# Patient Record
Sex: FEMALE | Race: WHITE | NOT HISPANIC OR LATINO | Employment: UNEMPLOYED | URBAN - METROPOLITAN AREA
[De-identification: names, ages, dates, MRNs, and addresses within clinical notes are randomized per-mention and may not be internally consistent; named-entity substitution may affect disease eponyms.]

---

## 2018-01-15 LAB
C TRACH RRNA SPEC QL PROBE: NEGATIVE
N GONORRHOEA RRNA SPEC QL PROBE: NEGATIVE

## 2018-02-14 ENCOUNTER — OFFICE VISIT (OUTPATIENT)
Dept: PERINATAL CARE | Facility: CLINIC | Age: 38
End: 2018-02-14
Payer: COMMERCIAL

## 2018-02-14 VITALS
DIASTOLIC BLOOD PRESSURE: 53 MMHG | HEIGHT: 63 IN | HEART RATE: 66 BPM | BODY MASS INDEX: 21.58 KG/M2 | WEIGHT: 121.8 LBS | SYSTOLIC BLOOD PRESSURE: 109 MMHG

## 2018-02-14 DIAGNOSIS — Z31.5 ENCOUNTER FOR PROCREATIVE GENETIC COUNSELING: Primary | ICD-10-CM

## 2018-02-14 DIAGNOSIS — O09.521 MATERNAL AGE 35+, MULTIGRAVIDA, FIRST TRIMESTER: ICD-10-CM

## 2018-02-14 PROCEDURE — 99205 OFFICE O/P NEW HI 60 MIN: CPT | Performed by: GENETIC COUNSELOR, MS

## 2018-02-14 RX ORDER — CYANOCOBALAMIN (VITAMIN B-12) 500 MCG
TABLET ORAL
COMMUNITY

## 2018-02-14 RX ORDER — PNV NO.95/FERROUS FUM/FOLIC AC 28MG-0.8MG
1 TABLET ORAL DAILY
COMMUNITY

## 2018-02-14 NOTE — PROGRESS NOTES
Genetic Counseling Note        Cherelle Fall     Appointment Date:  2/14/2018  Referred By: Porfirio Ching DO  YOB: 1980  Partner:  Eldora Boeck    Pregnancy History: [id number]  Estimated Date of Delivery: 9/2/18  Estimated Gestational Age: 5 weeks 3 days     Genetic Counseling:advanced maternal age    Khoa Bowens is a(n) 44 y o  female who is here to discuss maternal age related risk for aneuploidy    Issues Discussed:  Average population risk: 3-4% in the average pregnancy of serious condition or birth defect  2-3% risk of mental retardation  Not all detected by prenatal testing  Chromosomal: non-disjunction 1/104 risk overall, 1/189 risk for Down syndrome  Maternal age  Risk of aneuploidy    Options Discussed:  Amniocentesis: risks and limitations discussed  CVS: risks and limitations discussed  Level II ultrasound to screen for structural anomalies  Nuchal translucency/1st trimester serum screen: goals and limitations discussed  Cell free fetal DNA testing    Additional Information / Impression / Plan / Tests Ordered:  After discussing the available prenatal diagnostic and screening procedures Erica elected to decline prenatal diagnostic testing at this time  She did opt to move forward with cell free DNA  Blood will be drawn following the genetic counseling session and sent to [facility]  In addition she is planning to pursue nuchal translucency ultrasound, MSAFP and level 2 ultrasound evaluations at the appropriate times  During our counseling session histories were taken on the patient's family and her 's family  Khoa Bowens reports that her 's family history is negative for any prior known cases of intellectual disability, birth defects or single gene disorders  In reviewing her own family history she reports having a male paternal 1st cousin, the son of her father sister, who has Down syndrome  This is the only known case of Down syndrome in her family    We briefly discussed the fact the majority of cases of Down syndrome (95%) results from a random air in chromosome division, often related to maternal age, and are not hereditary  Less than 5% of cases of Down syndrome are due to a chromosome rearrangement that can be inherited in the family  Given the distance of the relative this history is not likely to significantly increase the patient's risk, over her age related risk, over having a child with Down syndrome  Cell free DNA testing will detect Down syndrome caused by either non destruction or a chromosome translocation  In further reviewing her family history Erica reports having a maternal uncle with intellectual disability  She was not aware of a specific diagnosis for this uncles intellectual disability therefore does not possible to clearly define any risk to her current pregnancy  She was made aware of fragile X syndrome which is the most common inherited form of intellectual disability  She believes that she had this and other carrier screening performed through her reproductive endocrinologist the results of which were negative though I do not currently have records documenting information  A record release form was faxed to her reproductive endocrinologist office to obtain that information  The patient reports that she and her  are both of [place] and [facility] descent with no known Shinto ancestry  Becki Brewer reports that she believes she had CF and SMA carrier screening performed as well  In addition given the history of repeated pregnancy loss she reports that she and her  also had chromosome analysis performed the results of which revealed they both have normal karyotypes  Those records were requested as well  Becki Brewer indicated that it was ultimately determined she has a uterine septum and slightly bicornuate uterus which is believed to have attributed to all the pregnancy losses      Lastly, we discussed the fact that it is important to keep in mind that everyone in the general population regardless of age, family history, or medical background has approximately a 3% risk of having a child with some type of her defect, genetic disease or intellectual disability  Currently there are no tests available to rule out all birth defects or health problems            Time spent with Genetic Counselor: 60 minutes

## 2018-03-02 ENCOUNTER — TELEPHONE (OUTPATIENT)
Dept: PERINATAL CARE | Facility: CLINIC | Age: 38
End: 2018-03-02

## 2018-03-02 NOTE — TELEPHONE ENCOUNTER
Pt notified and aware of NIPT results-wnl  Pt also aware of gender  MSAFP  Instruction given to pt  TRF mailed  Results faxed to OB

## 2018-04-12 ENCOUNTER — TELEPHONE (OUTPATIENT)
Dept: PERINATAL CARE | Facility: CLINIC | Age: 38
End: 2018-04-12

## 2018-06-20 ENCOUNTER — TRANSCRIBE ORDERS (OUTPATIENT)
Dept: PERINATAL CARE | Facility: CLINIC | Age: 38
End: 2018-06-20

## 2018-06-20 DIAGNOSIS — O99.810 ABNORMAL GLUCOSE COMPLICATING PREGNANCY: Primary | ICD-10-CM

## 2018-06-22 ENCOUNTER — OFFICE VISIT (OUTPATIENT)
Dept: PERINATAL CARE | Facility: CLINIC | Age: 38
End: 2018-06-22
Payer: COMMERCIAL

## 2018-06-22 VITALS
HEART RATE: 98 BPM | HEIGHT: 63 IN | WEIGHT: 139.8 LBS | BODY MASS INDEX: 24.77 KG/M2 | DIASTOLIC BLOOD PRESSURE: 71 MMHG | SYSTOLIC BLOOD PRESSURE: 121 MMHG

## 2018-06-22 DIAGNOSIS — O24.410 DIET CONTROLLED GESTATIONAL DIABETES MELLITUS (GDM) IN THIRD TRIMESTER: Primary | ICD-10-CM

## 2018-06-22 DIAGNOSIS — O99.810 ABNORMAL GLUCOSE COMPLICATING PREGNANCY: ICD-10-CM

## 2018-06-22 PROCEDURE — G0108 DIAB MANAGE TRN  PER INDIV: HCPCS | Performed by: DIETITIAN, REGISTERED

## 2018-06-22 RX ORDER — BLOOD-GLUCOSE METER
KIT MISCELLANEOUS
Qty: 1 EACH | Refills: 0 | Status: SHIPPED | OUTPATIENT
Start: 2018-06-22 | End: 2018-09-01

## 2018-06-22 RX ORDER — LANCETS 28 GAUGE
EACH MISCELLANEOUS
Qty: 100 EACH | Refills: 3 | Status: SHIPPED | OUTPATIENT
Start: 2018-06-22 | End: 2018-09-01

## 2018-06-22 RX ORDER — BLOOD-GLUCOSE METER
KIT MISCELLANEOUS
Qty: 100 EACH | Refills: 4 | Status: SHIPPED | OUTPATIENT
Start: 2018-06-22 | End: 2018-07-11 | Stop reason: SDUPTHER

## 2018-06-22 NOTE — PROGRESS NOTES
Date:  18  RE: Kirk Libman    : 1980  KATTY: Estimated Date of Delivery: 2018   EGA:              Dear Dr Cummings Ramp: Thank you for referring your patient to the [facility]regnancy Program at [address]  The patient attended Class 1 received the following education:     Pathophysiology of diabetes and pregnancy  This includes maternal-fetal complications such as fetal macrosomia,  hypoglycemia, polyhydramnios, increased incidence of  section, pre-term labor and in severe cases, fetal demise and stillbirth   Self-monitoring of blood glucose levels: fasting (goal 60mg/dl to 90mg/dl) and two hours after the start of the meal less (goal less than 120mg/dl)  The patient was shown how to use a Freestyle Lite blood glucose meter and prescriptions were sent to her pharmacy   Medical Nutrition Therapy for diabetes and pregnancy  The patient was provided with a 1900 calorie gestational diabetes meal plan and the following was reviewed:     o Basic review of macronutrients   o Meal pattern should consist of three small meals and three snacks daily  o Carbohydrate gram amounts per meal   o Instructions on how to read a food label  o Appropriate serving sizes for carbohydrates and proteins  o Incorporating protein at each meal and snack  o Maintain a three day food diary and bring to class 2    Report blood glucose levels to the [facility] weekly or as directed:  o Phone : [phone]  If no response in 24 hours, call [phone]   o Fax: [phone]  o Email: [email or web address]  The patient is scheduled to attend class 2 on Tuesday, 7/3/18  Additionally, fetal ultrasound evaluation by the Perinatologist has been scheduled to assure continuity of care  Please contact the Diabetes and Pregnancy Program at [phone] if you have any questions    Time spent with patient 11:30 AM-12:30 PM; time spent face to face counseling greater than 50% of the appointment      Sincerely,   Arias Jenkins  Diabetes Educator   Di[facility]regnancy [facility]

## 2018-06-28 ENCOUNTER — TELEPHONE (OUTPATIENT)
Dept: PERINATAL CARE | Facility: CLINIC | Age: 38
End: 2018-06-28

## 2018-06-28 NOTE — TELEPHONE ENCOUNTER
Date:  18  RE: Felipa Dietrich    : 1980  Estimated Date of Delivery: 18  EGA: 30w5d   OB/GYN: [facility]    Diet controlled gestational diabetes using a Free Style lite glucose meter  Date Fasting Post-  breakfast Post-  lunch Post-  dinner Before bedtime Carbs Comments    81 106 82 94       83 93 115 91       85 73 92 108       80 94 80 110       83 62 95 108       79 44                    Patient called today reporting hypoglycemia this morning following increased activity on  after dinner and also for  after breakfast   Patient treated hypoglycemia after breakfast on  with approximately 30 gms carbohydrate after eating a large banana  Patient stated she has a fairly active lifestyle and that current 1900 calories did not seem to be enough calories  Patient reported that glucose meter and supplies were stored correctly  Current regimen:  1900 calorie gestational diabetes meal plan; 3 meals and 3 snacks  Self-blood glucose monitoring 4 times per day; fasting and 2 hours pp  Plan:  Increase calories from 1900 to 2100 calories gestational diabetes diet; 3 meals and 3 snacks  Encouraged adequate protein in all meals and snacks  Hypoglycemia treatment using 15/15 rule was reviewed  Continue testing  Date due to report next:  Monday, 18; sooner for any problems      Karina Ortega RD,LDN,CDE  Diabetes Educator   [facility]regnancy [facility]

## 2018-07-03 ENCOUNTER — OFFICE VISIT (OUTPATIENT)
Dept: PERINATAL CARE | Facility: CLINIC | Age: 38
End: 2018-07-03
Payer: COMMERCIAL

## 2018-07-03 ENCOUNTER — TELEPHONE (OUTPATIENT)
Dept: PERINATAL CARE | Facility: CLINIC | Age: 38
End: 2018-07-03

## 2018-07-03 DIAGNOSIS — O24.410 DIET CONTROLLED GESTATIONAL DIABETES MELLITUS (GDM) IN THIRD TRIMESTER: Primary | ICD-10-CM

## 2018-07-03 PROCEDURE — G0108 DIAB MANAGE TRN  PER INDIV: HCPCS

## 2018-07-03 NOTE — TELEPHONE ENCOUNTER
Date:  18  RE: Waqas Garcia    : 1980  Estimated Date of Delivery: 18  EGA: 31w3d  [facility]      Date Fasting Post-  breakfast Post-  lunch Post-  dinner Before bedtime Carbs Comments   6-28   82 91      6-29 85 70 100 90      6-30 84 78 81 107      7-1 81 79 99 99      7-2 81 78 78 85                              Current regimen:  1900 calorie gestational diabetes meal plan; 3 meals and 3 snacks  Self-blood glucose monitoring 4 times per day; fasting and 2 hours pp        Plan:  Continue current regimen      Date due to report next:  Tuesday, 7-10-18    Fredrick Salomon RN  Diabetes Educator   [facility]regnancy [facility]

## 2018-07-05 NOTE — PROGRESS NOTES
DATE:  18  RE: Ivloida Duty    : 1980    KATTY: 18    EGA: 31w5d     Dear Juancarlos Turk doctors,    Thank you for referring your patient to the Diabetes and Pregnancy Program at [address]  The patient attended Class 2 received the following education:    Weight gain during in pregnancy  Based on the patients height of 63 inches, pre-pregnancy weight of 118 pounds ,20 9 BMI, we would recommend a total weight gain of 25-35 pounds for the pregnancy   The patients current weight is 136 pounds, and her weight gain to date is 18 pounds  Based on this, we recommend  a weight gain of 17 pounds for the remainder of the pregnancy   Medical Nutrition Therapy for diabetes and pregnancy  The patients three day food diary was reviewed and discussed  The patient was instructed on the following:  o Individualized meal plan    o Use of food diary to maintain a meal plan    o Importance of protein as it relates to blood glucose control   Review of blood glucose log  Reinforcement of blood glucose goals and reporting guidelines   Ultrasounds every four weeks in the [place] to evaluate fetal growth   Exercise Guidelines:   o Walking up to thirty minutes daily can reduce blood glucose levels  o Monitor for greater than four contractions per hour     o The patient has been instructed not to begin physical activity if she has been instructed not to exercise by your office   Sick day guidelines and hypoglycemia with treatment   Post-partum guidelines:  o Completion of a 75 gram glucose tolerance test at 6 weeks post-partum to check for type 2 diabetes  o 20% weight loss and 30 minutes of exercise 5 times per week reduces the risk of type 2 diabetes   Breastfeeding guidelines   Report blood glucose levels to [place] weekly or as directed  o Phone: [phone]   If no response in 24 hours, call [phone]    o Fax: [phone]  o Email: [email or web address]  [email or web address]    Please contact the Diabetes and Pregnancy Program at [phone] if you have questions  Time spent with patient [phone]; time spent face to face counseling greater than 50% of the appointment      Sincerely,     Delia Alegre RD,LDN,CDE  Diabetes Educator  Di[facility] Pregnancy Program

## 2018-07-11 ENCOUNTER — TELEPHONE (OUTPATIENT)
Dept: PERINATAL CARE | Facility: CLINIC | Age: 38
End: 2018-07-11

## 2018-07-11 DIAGNOSIS — O24.410 DIET CONTROLLED GESTATIONAL DIABETES MELLITUS (GDM) IN THIRD TRIMESTER: ICD-10-CM

## 2018-07-11 RX ORDER — BLOOD-GLUCOSE METER
KIT MISCELLANEOUS
Qty: 100 EACH | Refills: 3 | Status: SHIPPED | OUTPATIENT
Start: 2018-07-11 | End: 2018-09-20

## 2018-07-11 NOTE — TELEPHONE ENCOUNTER
Date:  18  RE: Josefina Mclean    : 1980  Estimated Date of Delivery: 18  EGA: 32w4d  OB/GYN: [facility]    Diet controlled gestational diabetes    Date Fasting Post-  breakfast Post-  lunch Post-  dinner Before bedtime Carbs Comments   7/3/18 84 74 84 87      18 79 80 95 86      18 90 105 87 100      18 82 117 121 101   Ice Cream [place] [address]   18 78 86 101 105      18 73 100 96 112       81 67 79 102      7/10/18 76 96 92 110          Current regimen:  1900 calorie gestational diabetes meal plan; 3 meals and 3 snacks  Self-blood glucose monitoring 4 times per day; fasting and 2 hours pp        Plan:  Continue current regimen  Gluocose strip refill sent to her pharmacy      Date due to report next:  Tuesday, 18    Broderick Siemens  Diabetes Educator   Diabetes [facility] Pregnancy Program

## 2018-07-18 ENCOUNTER — TELEPHONE (OUTPATIENT)
Dept: PERINATAL CARE | Facility: CLINIC | Age: 38
End: 2018-07-18

## 2018-07-18 NOTE — TELEPHONE ENCOUNTER
Date:  18  RE: Pili Ferraro    : 1980  Estimated Date of Delivery: 18  EGA: 33w4d  OB/GYN: [facility]    Diet controlled gestational diabetes    Date Fasting Post-  breakfast Post-  lunch Post-  dinner Comments    74 81 99 98     76 94 121 90     72 85 78 90     69 120 108 84    7/15 79 81 89 118     72 106 90 96     78 108 98 95        Current regimen:  1900 calorie gestational diabetes meal plan; 3 meals and 3 snacks  Self-blood glucose monitoring 4 times per day; fasting and 2 hours pp        Plan:  Continue current regimen  Gluocose strip refill sent to her pharmacy      Date due to report next:  18    MOLLY Simpson  Diabetes Educator   Di[facility]regnancy Program

## 2018-07-25 ENCOUNTER — TELEPHONE (OUTPATIENT)
Dept: PERINATAL CARE | Facility: CLINIC | Age: 38
End: 2018-07-25

## 2018-07-25 NOTE — TELEPHONE ENCOUNTER
Note      Date:  18  RE: Brian Petty    : 1980  Estimated Date of Delivery: 18  EGA: 34w4d  [facility]     Diet controlled gestational diabetes     Date Fasting Post-  breakfast Post-  lunch Post-  dinner Comments    74 96 115 107      77 91 84 120      79 106 111 101      76 77 111 94     78 116 87 92      79 58* 100 88 Missed snack    79 118 110 93                   Current regimen:  1900 calorie gestational diabetes meal plan; 3 meals and 3 snacks  Self-blood glucose monitoring 4 times per day; fasting and 2 hours pp        Plan:  Continue current regimen, try to eat 3 meals and 3 snacks   Stay active if no restriction from your OB, 30 minutes a day of physical activity       Date due to report next:  Tuesday, 18 or sooner if needed       Jimi Steiner blood sugar via phone    MOLLY Manning  Diabetes Educator   Diabetes [facility] Pregnancy Program

## 2018-08-02 ENCOUNTER — TELEPHONE (OUTPATIENT)
Dept: PERINATAL CARE | Facility: CLINIC | Age: 38
End: 2018-08-02

## 2018-08-02 NOTE — TELEPHONE ENCOUNTER
Date:  18  RE: Yina Montalvo    : 1980  Estimated Date of Delivery: 18  EGA: 35w5d  [facility]      Date Fasting Post-  breakfast Post-  lunch Post-  dinner Before bedtime Carbs Comments    76 63 95 118      - 76 88 102 95      - 79 112 70 91       71 84 88 110       71 76 110 118      30 82 70 79 110      31 77 67 102 90          Current regimen:  1900 calorie gestational diabetes meal plan; 3 meals and 3 snacks    Self-blood glucose monitoring 4 times per day; fasting and 2 hours pp        Plan:  Continue current regimen,    Date due to report next:  18    Ioana Card RN  Diabetes Educator   Diabetes [facility] Pregnancy Program

## 2018-08-08 ENCOUNTER — TELEPHONE (OUTPATIENT)
Dept: PERINATAL CARE | Facility: CLINIC | Age: 38
End: 2018-08-08

## 2018-08-08 NOTE — TELEPHONE ENCOUNTER
Note      Date:  18  RE: Shalonda Marquez    : 1980  Estimated Date of Delivery: 18  EGA: 36w4d  [facility]        Date Fasting Post-  breakfast Post-  lunch Post-  dinner Comments    75 106 118 85      75 118 109 89     8/3 90 110 84 92      72 74 92 120      74 134 84 94 blueberry bagel    71 127 120 109      76 89 110 90         Current regimen:  1900 calorie gestational diabetes meal plan; 3 meals and 3 snacks  Self-monitoring blood glucose 4 times per day; fasting and 2 hours pp        Plan:  Continue current regimen except try to avoid bagels    Stay active if no restriction from your OB      Date due to report next:  18     Transcribed by: Sari Ohara blood sugar log via phone    MOLLY Mcnally  Diabetes Educator   Diabetes [facility] Pregnancy Program

## 2018-08-17 ENCOUNTER — TELEPHONE (OUTPATIENT)
Dept: PERINATAL CARE | Facility: CLINIC | Age: 38
End: 2018-08-17

## 2018-08-17 NOTE — TELEPHONE ENCOUNTER
Note      Date:  18  RE: Ivory Duty    : 1980  Estimated Date of Delivery: 18  EGA: 37w6d  [facility]        Date Fasting Post-  breakfast Post-  lunch Post-  dinner Comments   8-8 72 118 130 95 cheated   8-9 71 94 90 110    8-10 75 117 110 95    8-11 73 115 91 101    8-12 74 118 82 95    8-13 73 95 80 93    8-14 75 85 93 110    8-15 72 150 92 105 cheated   8-16 74 85 110 130 cheated       Current regimen:  1900 calorie gestational diabetes meal plan; 3 meals and 3 snacks  Self-monitoring blood glucose 4 times per day; fasting and 2 hours pp        Plan:  Patient reported  is scheduled for today      Date due to report next:  none     Sarah Simpson, RD,LDN,CDE  Diabetes Educator   Di[facility] Pregnancy Program